# Patient Record
Sex: MALE | URBAN - METROPOLITAN AREA
[De-identification: names, ages, dates, MRNs, and addresses within clinical notes are randomized per-mention and may not be internally consistent; named-entity substitution may affect disease eponyms.]

---

## 2018-02-15 ENCOUNTER — TELEPHONE (OUTPATIENT)
Dept: OTHER | Facility: CLINIC | Age: 33
End: 2018-02-15

## 2018-02-15 NOTE — TELEPHONE ENCOUNTER
2/15/2018    Call Regarding Onboarding P1 Other    Attempt 1    Message on voicemail     Comments: 1 adult      Outreach   CC

## 2018-03-19 NOTE — TELEPHONE ENCOUNTER
03/19/2018    Call Regarding Onboarding P1 Other     Attempt 2     Message on voicemail      Comments:       Outreach ,  Chloé Sanchez

## 2018-04-19 NOTE — TELEPHONE ENCOUNTER
4/19/2018    Call Regarding Onboarding P1 Other     Attempt 3     Message on voicemail      Comments:        Outreach   MOHSEN QUESADA

## 2024-09-04 ENCOUNTER — APPOINTMENT (OUTPATIENT)
Dept: URBAN - METROPOLITAN AREA CLINIC 259 | Age: 39
Setting detail: DERMATOLOGY
End: 2024-09-04

## 2024-09-04 DIAGNOSIS — L72.8 OTHER FOLLICULAR CYSTS OF THE SKIN AND SUBCUTANEOUS TISSUE: ICD-10-CM

## 2024-09-04 PROCEDURE — 99202 OFFICE O/P NEW SF 15 MIN: CPT

## 2024-09-04 PROCEDURE — OTHER COUNSELING: OTHER

## 2024-09-04 PROCEDURE — OTHER DEFER: OTHER

## 2024-09-04 PROCEDURE — OTHER ADDITIONAL NOTES: OTHER

## 2024-09-04 PROCEDURE — OTHER MIPS QUALITY: OTHER

## 2024-09-04 ASSESSMENT — LOCATION SIMPLE DESCRIPTION DERM: LOCATION SIMPLE: LEFT CHEEK

## 2024-09-04 ASSESSMENT — LOCATION ZONE DERM: LOCATION ZONE: FACE

## 2024-09-04 ASSESSMENT — LOCATION DETAILED DESCRIPTION DERM: LOCATION DETAILED: LEFT SUPERIOR MEDIAL BUCCAL CHEEK

## 2024-09-04 NOTE — PROCEDURE: ADDITIONAL NOTES
Additional Notes: - Size : 9 x 1 mm\\n- discussed seeing  for excision\\n- pt declined ILK and I&D
Detail Level: Simple
Render Risk Assessment In Note?: no

## 2024-11-22 ENCOUNTER — APPOINTMENT (OUTPATIENT)
Dept: URBAN - METROPOLITAN AREA CLINIC 255 | Age: 39
Setting detail: DERMATOLOGY
End: 2024-11-22

## 2024-11-22 DIAGNOSIS — L72.8 OTHER FOLLICULAR CYSTS OF THE SKIN AND SUBCUTANEOUS TISSUE: ICD-10-CM

## 2024-11-22 PROCEDURE — OTHER MIPS QUALITY: OTHER

## 2024-11-22 PROCEDURE — OTHER COUNSELING: OTHER

## 2024-11-22 PROCEDURE — OTHER ADDITIONAL NOTES: OTHER

## 2024-11-22 PROCEDURE — 99212 OFFICE O/P EST SF 10 MIN: CPT

## 2024-11-22 NOTE — PROCEDURE: ADDITIONAL NOTES
Detail Level: Simple
Additional Notes: Clinically resolved on singular and bi-manual exam.  No surgery necessary.
Render Risk Assessment In Note?: no